# Patient Record
Sex: FEMALE | Race: WHITE | NOT HISPANIC OR LATINO | Employment: OTHER | URBAN - METROPOLITAN AREA
[De-identification: names, ages, dates, MRNs, and addresses within clinical notes are randomized per-mention and may not be internally consistent; named-entity substitution may affect disease eponyms.]

---

## 2022-05-11 NOTE — PROGRESS NOTES
Assessment/Plan:    Encounter for gynecological examination (general) (routine) without abnormal findings  All well, no complaints  Both children are engaged! Normal breast and pelvic exams  Last pap 5/2018, repeated today  Mammo order given, last 10/2020  Colonoscopy due next year  Dexa @67, no risk factors       Diagnoses and all orders for this visit:    Encounter for gynecological examination (general) (routine) without abnormal findings    Encounter for screening mammogram for malignant neoplasm of breast  -     Mammo screening bilateral w 3d & cad; Future    Screening for malignant neoplasm of the cervix  -     IGP, Aptima HPV, Rfx 16/18,45    Other orders  -     metroNIDAZOLE (METROCREAM) 0 75 % cream; APPLY DAILY TO SKIN TO AFFECTED AREA TWICE A DAY FOR 30 DAYS  -     fexofenadine (ALLEGRA) 180 MG tablet; Take 180 mg by mouth if needed          Subjective:      Patient ID: Michael Young is a 63 y o  female  Here for well check  The following portions of the patient's history were reviewed and updated as appropriate:   She  has a past medical history of Colon polyps and History of screening mammography (10/15/2020)  She   Patient Active Problem List    Diagnosis Date Noted    Encounter for gynecological examination (general) (routine) without abnormal findings 05/13/2022     She  has a past surgical history that includes Colonoscopy (2015) and Lipoma resection (2012)  Her family history includes Lung cancer (age of onset: 50) in her sister  She  reports that she has never smoked  She has never used smokeless tobacco  She reports current alcohol use  She reports that she does not use drugs    Current Outpatient Medications   Medication Sig Dispense Refill    fexofenadine (ALLEGRA) 180 MG tablet Take 180 mg by mouth if needed      metroNIDAZOLE (METROCREAM) 0 75 % cream APPLY DAILY TO SKIN TO AFFECTED AREA TWICE A DAY FOR 30 DAYS       No current facility-administered medications for this visit      She is allergic to sulfa antibiotics       Review of Systems  No PMB, breast, bladder, bowel changes   No new persistent pain, bloating, early satiety or pelvic pressure      Objective:      /68   Ht 5' 2 5" (1 588 m)   Wt 63 4 kg (139 lb 12 8 oz)   Breastfeeding No   BMI 25 16 kg/m²          Physical Exam  General appearance: no distress, pleasant  Neck: thyroid without nodules or thyromegaly, no palpable adenopathy  Lymph nodes: no palpable adenopathy  Breasts: no masses, nodes or skin changes, unchanged UOQ nodularity bilaterally  Abdomen: soft, non tender, no palpable masses  Pelvic exam: normal atrophic external genitalia, urethral meatus normal, vagina atrophic without lesions, cervix atrophic without lesions, uterus small, non tender, no adnexal masses, non tender  Rectal exam: normal sphincter tone, no masses, RV confirms above

## 2022-05-13 ENCOUNTER — ANNUAL EXAM (OUTPATIENT)
Dept: OBGYN CLINIC | Facility: CLINIC | Age: 62
End: 2022-05-13
Payer: COMMERCIAL

## 2022-05-13 VITALS
WEIGHT: 139.8 LBS | HEIGHT: 63 IN | BODY MASS INDEX: 24.77 KG/M2 | DIASTOLIC BLOOD PRESSURE: 68 MMHG | SYSTOLIC BLOOD PRESSURE: 110 MMHG

## 2022-05-13 DIAGNOSIS — Z01.419 ENCOUNTER FOR GYNECOLOGICAL EXAMINATION (GENERAL) (ROUTINE) WITHOUT ABNORMAL FINDINGS: Primary | ICD-10-CM

## 2022-05-13 DIAGNOSIS — Z12.31 ENCOUNTER FOR SCREENING MAMMOGRAM FOR MALIGNANT NEOPLASM OF BREAST: ICD-10-CM

## 2022-05-13 DIAGNOSIS — Z12.4 SCREENING FOR MALIGNANT NEOPLASM OF THE CERVIX: ICD-10-CM

## 2022-05-13 PROCEDURE — 99396 PREV VISIT EST AGE 40-64: CPT | Performed by: OBSTETRICS & GYNECOLOGY

## 2022-05-13 RX ORDER — FEXOFENADINE HCL 180 MG/1
180 TABLET ORAL AS NEEDED
COMMUNITY

## 2022-05-13 NOTE — LETTER
May 13, 2022     [address]  [facility] [address]    Patient: Benigno Reilly   YOB: 1960   Date of Visit: 5/13/2022       Dear Dr Jeanne Ruiz: Thank you for referring Francisco Luque to me for evaluation  Below are my notes for this consultation  If you have questions, please do not hesitate to call me  I look forward to following your patient along with you  Sincerely,        Barrie Caputo MD        CC: No Recipients  Barrie Caputo MD  5/13/2022 11:33 AM  Sign when Signing Visit  Assessment/Plan:    Encounter for gynecological examination (general) (routine) without abnormal findings  All well, no complaints  Both children are engaged! Normal breast and pelvic exams  Last pap 5/2018, repeated today  Mammo order given, last 10/2020  Colonoscopy due next year  Dexa @67, no risk factors       Diagnoses and all orders for this visit:    Encounter for gynecological examination (general) (routine) without abnormal findings    Encounter for screening mammogram for malignant neoplasm of breast  -     Mammo screening bilateral w 3d & cad; Future    Screening for malignant neoplasm of the cervix  -     IGP, Aptima HPV, Rfx 16/18,45    Other orders  -     metroNIDAZOLE (METROCREAM) 0 75 % cream; APPLY DAILY TO SKIN TO AFFECTED AREA TWICE A DAY FOR 30 DAYS  -     fexofenadine (ALLEGRA) 180 MG tablet; Take 180 mg by mouth if needed          Subjective:      Patient ID: Benigno Reilly is a 63 y o  female  Here for well check  The following portions of the patient's history were reviewed and updated as appropriate:   She  has a past medical history of Colon polyps and History of screening mammography (10/15/2020)    She   Patient Active Problem List    Diagnosis Date Noted    Encounter for gynecological examination (general) (routine) without abnormal findings 05/13/2022     She  has a past surgical history that includes Colonoscopy (2015) and Lipoma resection (2012)  Her family history includes Lung cancer (age of onset: 50) in her sister  She  reports that she has never smoked  She has never used smokeless tobacco  She reports current alcohol use  She reports that she does not use drugs  Current Outpatient Medications   Medication Sig Dispense Refill    fexofenadine (ALLEGRA) 180 MG tablet Take 180 mg by mouth if needed      metroNIDAZOLE (METROCREAM) 0 75 % cream APPLY DAILY TO SKIN TO AFFECTED AREA TWICE A DAY FOR 30 DAYS       No current facility-administered medications for this visit  She is allergic to sulfa antibiotics       Review of Systems  No PMB, breast, bladder, bowel changes   No new persistent pain, bloating, early satiety or pelvic pressure      Objective:      /68   Ht 5' 2 5" (1 588 m)   Wt 63 4 kg (139 lb 12 8 oz)   Breastfeeding No   BMI 25 16 kg/m²          Physical Exam  General appearance: no distress, pleasant  Neck: thyroid without nodules or thyromegaly, no palpable adenopathy  Lymph nodes: no palpable adenopathy  Breasts: no masses, nodes or skin changes, unchanged UOQ nodularity bilaterally  Abdomen: soft, non tender, no palpable masses  Pelvic exam: normal atrophic external genitalia, urethral meatus normal, vagina atrophic without lesions, cervix atrophic without lesions, uterus small, non tender, no adnexal masses, non tender  Rectal exam: normal sphincter tone, no masses, RV confirms above

## 2022-05-13 NOTE — ASSESSMENT & PLAN NOTE
All well, no complaints  Both children are engaged! Normal breast and pelvic exams    Last pap 5/2018, repeated today  Mammo order given, last 10/2020  Colonoscopy due next year  Dexa @67, no risk factors

## 2022-05-18 LAB
CYTOLOGIST CVX/VAG CYTO: NORMAL
DX ICD CODE: NORMAL
HPV I/H RISK 4 DNA CVX QL PROBE+SIG AMP: NEGATIVE
OTHER STN SPEC: NORMAL
PATH REPORT.FINAL DX SPEC: NORMAL
SL AMB NOTE:: NORMAL
SL AMB SPECIMEN ADEQUACY: NORMAL
SL AMB TEST METHODOLOGY: NORMAL

## 2022-08-23 DIAGNOSIS — Z12.31 ENCOUNTER FOR SCREENING MAMMOGRAM FOR MALIGNANT NEOPLASM OF BREAST: ICD-10-CM

## 2023-02-21 ENCOUNTER — OFFICE VISIT (OUTPATIENT)
Dept: OBGYN CLINIC | Facility: CLINIC | Age: 63
End: 2023-02-21

## 2023-02-21 VITALS
WEIGHT: 147.4 LBS | BODY MASS INDEX: 26.12 KG/M2 | DIASTOLIC BLOOD PRESSURE: 68 MMHG | SYSTOLIC BLOOD PRESSURE: 110 MMHG | HEIGHT: 63 IN

## 2023-02-21 DIAGNOSIS — N95.0 PMB (POSTMENOPAUSAL BLEEDING): Primary | ICD-10-CM

## 2023-02-21 RX ORDER — VITAMIN E (DL,TOCOPHERYL ACET) 180 MG
CAPSULE ORAL
COMMUNITY

## 2023-02-21 NOTE — LETTER
February 21, 2023     [place], [address]    Patient: Yojana Syed   YOB: 1960   Date of Visit: 2/21/2023       Dear Dr Yasmeen Evangelista Recipients: Thank you for referring Janet Hernandez to me for evaluation  Below are my notes for this consultation  If you have questions, please do not hesitate to call me  I look forward to following your patient along with you  Sincerely,        Adelita Yee MD        CC: No Recipients  Adelita Yee MD  2/21/2023  8:52 AM  Sign when Signing Visit  Assessment/Plan:    PMB (postmenopausal bleeding)  Noted light blood "flecks" in toilet and after void on paper 2/17-2/19 preceded by breast tenderness and cramping  No dysuria, frequency  On exam no bleeding noted, external os stenosis precluded biopsy  Will check u/a and C&S and pelvic ultrasound  Will need to reconsider endometrial sampling if EMS >4 mm  If blood noted in analysis will need urology referral      Will contact with further bleeding  Diagnoses and all orders for this visit:    PMB (postmenopausal bleeding)  -     US pelvis complete w transvaginal; Future  -     Urinalysis with microscopic; Future  -     Urine culture; Future  -     Urinalysis with microscopic  -     Urine culture    Other orders  -     Liquid-based pap, screening  -     Biotin w/ Vitamins C & E (Hair/Skin/Nails) 1250-7 5-7 5 MCG-MG-UNT CHEW; Chew Daily at 2am         Subjective:     Patient ID: Yojana Syed is a 63 y o  female  HPI Here with vaginal bleeding  LMP 8 years ago  The following portions of the patient's history were reviewed and updated as appropriate: She  has a past medical history of Colon polyps, History of screening mammography (10/15/2020), and History of screening mammography (08/15/2022)    She   Patient Active Problem List    Diagnosis Date Noted   • PMB (postmenopausal bleeding) 02/21/2023   • Encounter for gynecological examination (general) (routine) without abnormal findings 05/13/2022     She  has a past surgical history that includes Colonoscopy (2015) and Lipoma resection (2012)  Her family history includes Lung cancer (age of onset: 50) in her sister  She  reports that she has never smoked  She has never used smokeless tobacco  She reports current alcohol use  She reports that she does not currently use drugs  Current Outpatient Medications   Medication Sig Dispense Refill   • Biotin w/ Vitamins C & E (Hair/Skin/Nails) 1250-7 5-7 5 MCG-MG-UNT CHEW Chew Daily at 2am     • fexofenadine (ALLEGRA) 180 MG tablet Take 180 mg by mouth if needed     • metroNIDAZOLE (METROCREAM) 0 75 % cream if needed       No current facility-administered medications for this visit  She is allergic to sulfa antibiotics       Review of Systems +PMB, ?hematuria  No UTI symptoms  +cramping and breast tenderness resolved    Objective:      /68   Ht 5' 2 5" (1 588 m)   Wt 66 9 kg (147 lb 6 4 oz)   Breastfeeding No   BMI 26 53 kg/m²         Physical Exam   General appearance: no distress, pleasant  Pelvic exam: normal atrophic external genitalia, urethral meatus normal, vagina atrophic without lesions, cervix atrophic without bleeding; external os stenosis, uterus small, non tender, no adnexal masses, non tender  Rectal exam: deferred

## 2023-02-21 NOTE — PROGRESS NOTES
Assessment/Plan:    PMB (postmenopausal bleeding)  Noted light blood "flecks" in toilet and after void on paper 2/17-2/19 preceded by breast tenderness and cramping  No dysuria, frequency  On exam no bleeding noted, external os stenosis precluded biopsy  Will check u/a and C&S and pelvic ultrasound  Will need to reconsider endometrial sampling if EMS >4 mm  If blood noted in analysis will need urology referral      Will contact with further bleeding  Diagnoses and all orders for this visit:    PMB (postmenopausal bleeding)  -     US pelvis complete w transvaginal; Future  -     Urinalysis with microscopic; Future  -     Urine culture; Future  -     Urinalysis with microscopic  -     Urine culture    Other orders  -     Liquid-based pap, screening  -     Biotin w/ Vitamins C & E (Hair/Skin/Nails) 1250-7 5-7 5 MCG-MG-UNT CHEW; Chew Daily at 2am          Subjective:      Patient ID: Lynda Mcconnell is a 63 y o  female  HPI Here with vaginal bleeding  LMP 8 years ago  The following portions of the patient's history were reviewed and updated as appropriate: She  has a past medical history of Colon polyps, History of screening mammography (10/15/2020), and History of screening mammography (08/15/2022)  She   Patient Active Problem List    Diagnosis Date Noted   • PMB (postmenopausal bleeding) 02/21/2023   • Encounter for gynecological examination (general) (routine) without abnormal findings 05/13/2022     She  has a past surgical history that includes Colonoscopy (2015) and Lipoma resection (2012)  Her family history includes Lung cancer (age of onset: 50) in her sister  She  reports that she has never smoked  She has never used smokeless tobacco  She reports current alcohol use  She reports that she does not currently use drugs    Current Outpatient Medications   Medication Sig Dispense Refill   • Biotin w/ Vitamins C & E (Hair/Skin/Nails) 1250-7 5-7 5 MCG-MG-UNT CHEW Chew Daily at 2am     • fexofenadine (ALLEGRA) 180 MG tablet Take 180 mg by mouth if needed     • metroNIDAZOLE (METROCREAM) 0 75 % cream if needed       No current facility-administered medications for this visit  She is allergic to sulfa antibiotics       Review of Systems  +PMB, ?hematuria  No UTI symptoms  +cramping and breast tenderness resolved    Objective:      /68   Ht 5' 2 5" (1 588 m)   Wt 66 9 kg (147 lb 6 4 oz)   Breastfeeding No   BMI 26 53 kg/m²          Physical Exam    General appearance: no distress, pleasant  Pelvic exam: normal atrophic external genitalia, urethral meatus normal, vagina atrophic without lesions, cervix atrophic without bleeding; external os stenosis, uterus small, non tender, no adnexal masses, non tender  Rectal exam: deferred

## 2023-02-21 NOTE — ASSESSMENT & PLAN NOTE
Noted light blood "flecks" in toilet and after void on paper 2/17-2/19 preceded by breast tenderness and cramping  No dysuria, frequency  On exam no bleeding noted, external os stenosis precluded biopsy  Will check u/a and C&S and pelvic ultrasound  Will need to reconsider endometrial sampling if EMS >4 mm  If blood noted in analysis will need urology referral      Will contact with further bleeding

## 2023-02-21 NOTE — PATIENT INSTRUCTIONS
Please call the office if you do not hear about your results in 10-14 days  If you have any further bleeding please let me know

## 2023-02-22 ENCOUNTER — HOSPITAL ENCOUNTER (OUTPATIENT)
Dept: ULTRASOUND IMAGING | Facility: HOSPITAL | Age: 63
Discharge: HOME | End: 2023-02-22
Attending: OBSTETRICS & GYNECOLOGY

## 2023-02-22 DIAGNOSIS — N95.0 PMB (POSTMENOPAUSAL BLEEDING): ICD-10-CM

## 2023-02-23 LAB
APPEARANCE UR: CLEAR
BACTERIA UR CULT: NORMAL
BACTERIA URNS QL MICRO: NORMAL
BILIRUB UR QL STRIP: NEGATIVE
CASTS URNS QL MICRO: NORMAL /LPF
COLOR UR: YELLOW
EPI CELLS #/AREA URNS HPF: NORMAL /HPF (ref 0–10)
GLUCOSE UR QL: NEGATIVE
HGB UR QL STRIP: NEGATIVE
KETONES UR QL STRIP: NEGATIVE
LEUKOCYTE ESTERASE UR QL STRIP: NEGATIVE
Lab: NO GROWTH
MICRO URNS: NORMAL
MICRO URNS: NORMAL
NITRITE UR QL STRIP: NEGATIVE
PH UR STRIP: 7.5 [PH] (ref 5–7.5)
PROT UR QL STRIP: NEGATIVE
RBC #/AREA URNS HPF: NORMAL /HPF (ref 0–2)
SP GR UR: 1.01 (ref 1–1.03)
UROBILINOGEN UR STRIP-ACNC: 0.2 MG/DL (ref 0.2–1)
WBC #/AREA URNS HPF: NORMAL /HPF (ref 0–5)

## 2023-03-01 ENCOUNTER — TELEPHONE (OUTPATIENT)
Dept: OBGYN CLINIC | Facility: CLINIC | Age: 63
End: 2023-03-01

## 2023-03-01 NOTE — TELEPHONE ENCOUNTER
CHIKIS @[facility] notifying significant findings on 2/22 Ultrasound report   Report in chart

## 2023-03-02 ENCOUNTER — TELEPHONE (OUTPATIENT)
Dept: OBGYN CLINIC | Facility: CLINIC | Age: 63
End: 2023-03-02

## 2023-03-02 DIAGNOSIS — N88.2 CERVICAL STENOSIS (UTERINE CERVIX): Primary | ICD-10-CM

## 2023-03-02 RX ORDER — MISOPROSTOL 100 UG/1
TABLET ORAL
Qty: 1 TABLET | Refills: 0 | Status: SHIPPED | OUTPATIENT
Start: 2023-03-02 | End: 2023-03-08 | Stop reason: SDUPTHER

## 2023-03-02 NOTE — TELEPHONE ENCOUNTER
Spoke with patient and informed based upon US results, Dr Yasmine Pacheco would like to attempt another endo bx  A Rx for cytotec was sent to the pharmacy,instructed to to use 1/2 tablet vaginally the night prior to and the other half the morning of the biopsy  Recommended to take ibuprofen 600 mg 1hour prior to appointment  Pt transferred to reception to schedule Embx  Informed by  only availability in Dr Chloe Villarreal schedule is 4/3/23 or 4/4/23  Pt is requesting to be seen sooner  Please review schedule and advise

## 2023-03-02 NOTE — TELEPHONE ENCOUNTER
Please call with u/s results that warrant another attempt at endo bx  I sent cytotec rx in to pharmacy to use 1/2 tablet vaginally the night prior to and the other half the morning of the biopsy  Please schedule and remind to take ibuprofen  Thanks

## 2023-03-08 RX ORDER — MISOPROSTOL 100 UG/1
TABLET ORAL
Qty: 1 TABLET | Refills: 0 | Status: SHIPPED | OUTPATIENT
Start: 2023-03-08

## 2023-03-08 NOTE — TELEPHONE ENCOUNTER
Called patient and advised to  Rx at [facility] pharmacy as on file  Patient verbalized understanding

## 2023-03-08 NOTE — TELEPHONE ENCOUNTER
Pt presented to office today for EMBx however pt is scheduled for 3/9/23  Pt initiated pre-treatment of cytotec in preparation for appointment  Please send another Rx for Cytotec to [facility] [employer]  Pt request to have pharmacy "CUT" tablet

## 2023-03-08 NOTE — TELEPHONE ENCOUNTER
Rx sent along with instructions to pharmacist to cut tablet for patient      Nasim Perez MD  3/8/2023 12:36 PM

## 2023-03-09 ENCOUNTER — PROCEDURE VISIT (OUTPATIENT)
Dept: OBGYN CLINIC | Facility: CLINIC | Age: 63
End: 2023-03-09

## 2023-03-09 VITALS
SYSTOLIC BLOOD PRESSURE: 120 MMHG | WEIGHT: 144 LBS | HEIGHT: 63 IN | BODY MASS INDEX: 25.52 KG/M2 | DIASTOLIC BLOOD PRESSURE: 80 MMHG

## 2023-03-09 DIAGNOSIS — N95.0 PMB (POSTMENOPAUSAL BLEEDING): Primary | ICD-10-CM

## 2023-03-09 PROBLEM — N83.291 OTHER OVARIAN CYST, RIGHT SIDE: Status: ACTIVE | Noted: 2023-03-09

## 2023-03-09 NOTE — PROGRESS NOTES
Assessment/Plan:    Other ovarian cyst, right side  Small non-suspicious nodule noted on right with recommendation to f/u in one year  Will order follow up u/s at well check in the spring  Discussed, agrees to plan  PMB (postmenopausal bleeding)  No further bleeding  Urine studies were normal    U/S with 3 3 cm submucosal myoma and 8 mm EMS with cystic foci  Endo bx after cytotec today, will contact with results  Diagnoses and all orders for this visit:    PMB (postmenopausal bleeding)  -     Histology Specimen    Other orders  -     Endometrial biopsy          Subjective:      Patient ID: Howie Zepeda is a 63 y o  female  HPI Here for u/s review and endometrial biopsy  The following portions of the patient's history were reviewed and updated as appropriate: She  has a past medical history of Colon polyps, History of screening mammography (10/15/2020), and History of screening mammography (08/15/2022)  She   Patient Active Problem List    Diagnosis Date Noted   • Other ovarian cyst, right side 03/09/2023   • PMB (postmenopausal bleeding) 02/21/2023   • Encounter for gynecological examination (general) (routine) without abnormal findings 05/13/2022     She  has a past surgical history that includes Colonoscopy (2015) and Lipoma resection (2012)  Her family history includes Lung cancer (age of onset: 50) in her sister  She  reports that she has never smoked  She has never used smokeless tobacco  She reports current alcohol use  She reports that she does not currently use drugs    Current Outpatient Medications   Medication Sig Dispense Refill   • Biotin w/ Vitamins C & E (Hair/Skin/Nails) 1250-7 5-7 5 MCG-MG-UNT CHEW Chew Daily at 2am     • fexofenadine (ALLEGRA) 180 MG tablet Take 180 mg by mouth if needed     • metroNIDAZOLE (METROCREAM) 0 75 % cream if needed     • miSOPROStol (Cytotec) 100 mcg tablet 1/2 tablet in the vagina the night prior to the procedure; repeat with the other half the morning of the procedure  1 tablet 0     No current facility-administered medications for this visit  She is allergic to sulfa antibiotics       Review of Systems      Objective:      /80 (BP Location: Right arm, Patient Position: Sitting, Cuff Size: Standard)   Ht 5' 2 5" (1 588 m)   Wt 65 3 kg (144 lb)   BMI 25 92 kg/m²          Physical Exam    Appears well, no apparent distress  Does not appear anxious or depressed  Pelvic exam: normal external genitalia, vagina normal no abnormal discharge, cervix  with external os stenosis, overcome with touch of #11 blade    Endometrial biopsy    Date/Time: 3/9/2023 12:14 PM  Performed by: Yenifer Silva MD  Authorized by: Yenifer Silva MD   Universal Protocol:  Consent: Verbal consent obtained  Written consent obtained  Risks and benefits: risks, benefits and alternatives were discussed  Consent given by: patient  Patient understanding: patient states understanding of the procedure being performed  Patient consent: the patient's understanding of the procedure matches consent given  Relevant documents: relevant documents present and verified  Patient identity confirmed: verbally with patient      Indication:     Indications: Post-menopausal bleeding      Progression of post-menopausal bleeding:  Resolved  Pre-procedure:     Anesthesia premedication: cytotec    Procedure:     Procedure: endometrial biopsy with Pipelle      A bivalve speculum was placed in the vagina: yes      Cervix cleaned and prepped: yes      A paracervical block was performed: no      The cervix was dilated: yes (after eva with #11 blade at scarred external os)      Uterus sounded: yes      Uterus sound depth (cm):  8    Specimen collected: specimen collected and sent to pathology      Patient tolerated procedure well with no complications: yes    Findings:     Uterus size:  Non-gravid    Cervix: normal    Comments:     Procedure comments:  Endo bx after scarred external os overcome with sharp incision            2/22/23 u/s: Images reviewed  AV uterus 8 5 cm with 3 3 cm submucosal myoma  EMS 8 mm with cystic foci   R ov 2 7 with 1 6 cm avascular hypoechoic nodule and L ov 1 7 cm, WNL

## 2023-03-09 NOTE — ASSESSMENT & PLAN NOTE
Small non-suspicious nodule noted on right with recommendation to f/u in one year  Will order follow up u/s at well check in the spring  Discussed, agrees to plan

## 2023-03-09 NOTE — ASSESSMENT & PLAN NOTE
No further bleeding  Urine studies were normal    U/S with 3 3 cm submucosal myoma and 8 mm EMS with cystic foci  Endo bx after cytotec today, will contact with results

## 2023-03-14 LAB
PATH REPORT.SITE OF ORIGIN SPEC: NORMAL
PAYMENT PROCEDURE: NORMAL
SL AMB .: NORMAL

## 2023-03-28 ENCOUNTER — TELEPHONE (OUTPATIENT)
Dept: GASTROENTEROLOGY | Facility: AMBULARY SURGERY CENTER | Age: 63
End: 2023-03-28

## 2023-03-28 NOTE — TELEPHONE ENCOUNTER
Patients GI provider:  Dr Cline Fraction    Number to return call: [phone]    Reason for call: Pt calling stating she is due to have a repeat colonoscopy  Patient stated she spoke with a nurse and says that she is due in ten years but from last procedure she was advised that she is due in 7 years  Requesting to speak with someone to clarify       Scheduled procedure/appointment date if applicable: Apt/procedure n/a

## 2023-03-28 NOTE — TELEPHONE ENCOUNTER
Looked up pt last colon report  Per Dr Macy Sterling note, pt is a 10 year recall  She is due 9/2023  Spoke with pt  And relayed the info

## 2023-10-06 ENCOUNTER — ANNUAL EXAM (OUTPATIENT)
Dept: OBGYN CLINIC | Facility: CLINIC | Age: 63
End: 2023-10-06
Payer: COMMERCIAL

## 2023-10-06 VITALS
BODY MASS INDEX: 26.79 KG/M2 | DIASTOLIC BLOOD PRESSURE: 62 MMHG | HEIGHT: 63 IN | WEIGHT: 151.2 LBS | SYSTOLIC BLOOD PRESSURE: 110 MMHG

## 2023-10-06 DIAGNOSIS — Z01.419 ENCOUNTER FOR GYNECOLOGICAL EXAMINATION (GENERAL) (ROUTINE) WITHOUT ABNORMAL FINDINGS: Primary | ICD-10-CM

## 2023-10-06 DIAGNOSIS — N83.291 OTHER OVARIAN CYST, RIGHT SIDE: ICD-10-CM

## 2023-10-06 DIAGNOSIS — Z12.31 BREAST CANCER SCREENING BY MAMMOGRAM: ICD-10-CM

## 2023-10-06 PROBLEM — N95.0 PMB (POSTMENOPAUSAL BLEEDING): Status: RESOLVED | Noted: 2023-02-21 | Resolved: 2023-10-06

## 2023-10-06 PROCEDURE — 99396 PREV VISIT EST AGE 40-64: CPT | Performed by: OBSTETRICS & GYNECOLOGY

## 2023-10-06 NOTE — ASSESSMENT & PLAN NOTE
All well, no complaints. Normal breast and pelvic exams.   Last pap 5/2022 neg/HPV neg; due 2027  Mammo order given, last 8/15/22  Colonoscopy 2015, calling to schedule  Dexa @67, no risk factors

## 2023-10-06 NOTE — LETTER
October 6, 2023     Kyle Husbands, [address]    Patient: Dameon Renteria   YOB: 1960   Date of Visit: 10/6/2023       Dear Dr. Toure Base:    Ophelia Abraham was in today for her annual gyn exam. Below are my notes for this consultation. If you have questions, please do not hesitate to call me. I look forward to following your patient along with you. Sincerely,        Jennie Osei MD        CC: No Recipients    Jennie Osei MD  10/6/2023  1:02 PM  Incomplete  Assessment/Plan:    Encounter for gynecological examination (general) (routine) without abnormal findings  All well, no complaints. Normal breast and pelvic exams. Last pap 5/2022 neg/HPV neg; due 2027  Mammo order given, last 8/15/22  Colonoscopy 2015, calling to schedule  Dexa @67, no risk factors        Other ovarian cyst, right side  Will follow up non-suspicious nodule noted on right in March. Orders given. Diagnoses and all orders for this visit:    Breast cancer screening by mammogram  -     Mammo screening bilateral w 3d & cad; Future    Other ovarian cyst, right side  -     US pelvis complete w transvaginal; Future    Encounter for gynecological examination (general) (routine) without abnormal findings         Subjective:     Patient ID: Dameon Renteria is a 63 y.o. female. HPI Here for well check. The following portions of the patient's history were reviewed and updated as appropriate:   She  has a past medical history of Colon polyps, History of screening mammography (10/15/2020), and History of screening mammography (08/15/2022). She   Patient Active Problem List    Diagnosis Date Noted   • Other ovarian cyst, right side 03/09/2023   • Encounter for gynecological examination (general) (routine) without abnormal findings 05/13/2022     She  has a past surgical history that includes Colonoscopy (2015) and Lipoma resection (2012).   Her family history includes Lung cancer (age of onset: 50) in her sister; No Known Problems in her daughter, father, maternal grandfather, maternal grandmother, mother, paternal grandfather, paternal grandmother, and son; Reye's syndrome in her brother. She  reports that she has never smoked. She has never used smokeless tobacco. She reports current alcohol use. She reports that she does not use drugs. Current Outpatient Medications   Medication Sig Dispense Refill   • Biotin w/ Vitamins C & E (Hair/Skin/Nails) 1250-7.5-7.5 MCG-MG-UNT CHEW Chew Daily at 2am     • fexofenadine (ALLEGRA) 180 MG tablet Take 180 mg by mouth if needed       No current facility-administered medications for this visit. She is allergic to sulfa antibiotics. .    Review of Systems No PMB, breast, bladder, bowel changes. No new persistent pain, bloating, early satiety or pelvic pressure      Objective:      /62 (BP Location: Left arm, Patient Position: Sitting, Cuff Size: Standard)   Ht 5' 2.5" (1.588 m)   Wt 68.6 kg (151 lb 3.2 oz)   BMI 27.21 kg/m²         Physical Exam   General appearance: no distress, pleasant  Neck: thyroid without nodules or thyromegaly, no palpable adenopathy  Lymph nodes: no palpable adenopathy  Breasts: no masses, nodes or skin changes  Abdomen: soft, non tender, no palpable masses  Pelvic exam: normal atrophic external genitalia, urethral meatus normal, vagina atrophic without lesions, cervix atrophic without lesions, uterus small, non tender, no adnexal masses, non tender  Rectal exam: normal sphincter tone, no masses, RV confirms above      Grant Zelaya MD  10/6/2023 12:48 PM  Sign when Signing Visit  Assessment/Plan:    No problem-specific Assessment & Plan notes found for this encounter. Diagnoses and all orders for this visit:    Breast cancer screening by mammogram  -     Mammo screening bilateral w 3d & cad; Future         Subjective:     Patient ID: Cortez Edward is a 63 y.o. female. HPI Here for well check.       The following portions of the patient's history were reviewed and updated as appropriate:   She  has a past medical history of Colon polyps, History of screening mammography (10/15/2020), and History of screening mammography (08/15/2022). She   Patient Active Problem List    Diagnosis Date Noted   • Other ovarian cyst, right side 03/09/2023   • PMB (postmenopausal bleeding) 02/21/2023   • Encounter for gynecological examination (general) (routine) without abnormal findings 05/13/2022     She  has a past surgical history that includes Colonoscopy (2015) and Lipoma resection (2012). Her family history includes Lung cancer (age of onset: 50) in her sister; No Known Problems in her daughter, father, maternal grandfather, maternal grandmother, mother, paternal grandfather, paternal grandmother, and son; Reye's syndrome in her brother. She  reports that she has never smoked. She has never used smokeless tobacco. She reports current alcohol use. She reports that she does not use drugs. Current Outpatient Medications   Medication Sig Dispense Refill   • Biotin w/ Vitamins C & E (Hair/Skin/Nails) 1250-7.5-7.5 MCG-MG-UNT CHEW Chew Daily at 2am     • fexofenadine (ALLEGRA) 180 MG tablet Take 180 mg by mouth if needed     • metroNIDAZOLE (METROCREAM) 0.75 % cream if needed     • miSOPROStol (Cytotec) 100 mcg tablet 1/2 tablet in the vagina the night prior to the procedure; repeat with the other half the morning of the procedure. 1 tablet 0     No current facility-administered medications for this visit. She is allergic to sulfa antibiotics. .    Review of Systems No PMB, breast, bladder, bowel changes.  No new persistent pain, bloating, early satiety or pelvic pressure      Objective:      /62 (BP Location: Left arm, Patient Position: Sitting, Cuff Size: Standard)   Ht 5' 2.5" (1.588 m)   Wt 68.6 kg (151 lb 3.2 oz)   BMI 27.21 kg/m²         Physical Exam   General appearance: no distress, pleasant  Neck: thyroid without nodules or thyromegaly, no palpable adenopathy  Lymph nodes: no palpable adenopathy  Breasts: no masses, nodes or skin changes  Abdomen: soft, non tender, no palpable masses  Pelvic exam: normal atrophic external genitalia, urethral meatus normal, vagina atrophic without lesions, cervix atrophic without lesions, uterus small, non tender, no adnexal masses, non tender  Rectal exam: normal sphincter tone, no masses, RV confirms above

## 2023-10-06 NOTE — PATIENT INSTRUCTIONS
Return to office in one year unless having any problems such as breast changes, bleeding, new persistent pain, new progressive bloating, new problems eating (getting full to quickly) or new constant urinary pressure that does not resolve in one week. Call in six months to schedule your annual visit. Continue to strive for 1200 mg of calcium and 1000 IU of vitamin D daily in diet and supplements combined for your bone health. You can only absorb 600 mg of calcium at a time. Avoid excess calcium as this may adversely effect your heart. There are 400 mg of calcium in an 8 oz serving of dairy. Schedule the ultrasound for early March to follow up the small ovarian cyst seen in 2023.

## 2023-10-06 NOTE — PROGRESS NOTES
Assessment/Plan:    Encounter for gynecological examination (general) (routine) without abnormal findings  All well, no complaints. Normal breast and pelvic exams. Last pap 5/2022 neg/HPV neg; due 2027  Mammo order given, last 8/15/22  Colonoscopy 2015, calling to schedule  Dexa @67, no risk factors        Other ovarian cyst, right side  Will follow up non-suspicious nodule noted on right in March. Orders given. Diagnoses and all orders for this visit:    Encounter for gynecological examination (general) (routine) without abnormal findings    Breast cancer screening by mammogram  -     Mammo screening bilateral w 3d & cad; Future    Other ovarian cyst, right side  -     US pelvis complete w transvaginal; Future          Subjective:      Patient ID: Fahad Higgins is a 63 y.o. female. HPI Here for well check. The following portions of the patient's history were reviewed and updated as appropriate:   She  has a past medical history of Colon polyps, History of screening mammography (10/15/2020), and History of screening mammography (08/15/2022). She   Patient Active Problem List    Diagnosis Date Noted   • Other ovarian cyst, right side 03/09/2023   • Encounter for gynecological examination (general) (routine) without abnormal findings 05/13/2022     She  has a past surgical history that includes Colonoscopy (2015) and Lipoma resection (2012). Her family history includes Lung cancer (age of onset: 50) in her sister; No Known Problems in her daughter, father, maternal grandfather, maternal grandmother, mother, paternal grandfather, paternal grandmother, and son; Reye's syndrome in her brother. She  reports that she has never smoked. She has never used smokeless tobacco. She reports current alcohol use. She reports that she does not use drugs.   Current Outpatient Medications   Medication Sig Dispense Refill   • Biotin w/ Vitamins C & E (Hair/Skin/Nails) 1250-7.5-7.5 MCG-MG-UNT CHEW Chew Daily at 2am     • fexofenadine (ALLEGRA) 180 MG tablet Take 180 mg by mouth if needed       No current facility-administered medications for this visit. She is allergic to sulfa antibiotics. .    Review of Systems  No PMB, breast, bladder, bowel changes.  No new persistent pain, bloating, early satiety or pelvic pressure      Objective:      /62 (BP Location: Left arm, Patient Position: Sitting, Cuff Size: Standard)   Ht 5' 2.5" (1.588 m)   Wt 68.6 kg (151 lb 3.2 oz)   BMI 27.21 kg/m²          Physical Exam    General appearance: no distress, pleasant  Neck: thyroid without nodules or thyromegaly, no palpable adenopathy  Lymph nodes: no palpable adenopathy  Breasts: no masses, nodes or skin changes  Abdomen: soft, non tender, no palpable masses  Pelvic exam: normal atrophic external genitalia, urethral meatus normal, vagina atrophic without lesions, cervix atrophic without lesions, uterus small, non tender, no adnexal masses, non tender  Rectal exam: normal sphincter tone, no masses, RV confirms above

## 2024-02-14 ENCOUNTER — HOSPITAL ENCOUNTER (OUTPATIENT)
Dept: MAMMOGRAPHY | Facility: CLINIC | Age: 64
Discharge: HOME | End: 2024-02-14
Payer: COMMERCIAL

## 2024-02-14 VITALS — BODY MASS INDEX: 26.75 KG/M2 | WEIGHT: 151 LBS | HEIGHT: 63 IN

## 2024-02-14 DIAGNOSIS — Z12.31 BREAST CANCER SCREENING BY MAMMOGRAM: ICD-10-CM

## 2024-02-14 PROCEDURE — 77067 SCR MAMMO BI INCL CAD: CPT

## 2024-02-14 PROCEDURE — 77063 BREAST TOMOSYNTHESIS BI: CPT

## 2024-02-21 PROBLEM — Z01.419 ENCOUNTER FOR GYNECOLOGICAL EXAMINATION (GENERAL) (ROUTINE) WITHOUT ABNORMAL FINDINGS: Status: RESOLVED | Noted: 2022-05-13 | Resolved: 2024-02-21

## 2024-03-13 ENCOUNTER — HOSPITAL ENCOUNTER (OUTPATIENT)
Dept: ULTRASOUND IMAGING | Facility: HOSPITAL | Age: 64
Discharge: HOME | End: 2024-03-13
Attending: OBSTETRICS & GYNECOLOGY
Payer: COMMERCIAL

## 2024-03-13 DIAGNOSIS — N83.291 OTHER OVARIAN CYST, RIGHT SIDE: ICD-10-CM

## 2024-03-13 PROCEDURE — 76830 TRANSVAGINAL US NON-OB: CPT

## 2024-03-13 PROCEDURE — 76856 US EXAM PELVIC COMPLETE: CPT

## 2024-06-17 ENCOUNTER — TELEPHONE (OUTPATIENT)
Age: 64
End: 2024-06-17

## 2024-06-17 NOTE — TELEPHONE ENCOUNTER
Does the patient want to see a Gastroenterologist prior to their procedure OR are they having any GI symptoms? no    Has the patient been hospitalized or had abdominal surgery in the past 6 months? no    Does the patient use supplemental oxygen? no    Does the patient take Coumadin, Lovenox, Plavix, Elliquis, Xarelto, or other blood thinning medication? no    Has the patient had a stroke, cardiac event, or stent placed in the past year? no    BMI 27.18

## 2024-06-17 NOTE — TELEPHONE ENCOUNTER
Scheduled date of colonoscopy (as of today):7/18/24  Physician performing colonoscopy:DR LOWRY  Location of colonoscopy:[facility]  Bowel prep reviewed with patient:YARITZA/TOO SENT TO [facility]  Instructions reviewed with patient by:MEL  Clearances: NA

## 2024-06-17 NOTE — TELEPHONE ENCOUNTER
We received a message via [employer] that she wants to schedule a colonoscopy. Left VM to call the office back

## 2024-06-20 ENCOUNTER — ANESTHESIA (OUTPATIENT)
Dept: ANESTHESIOLOGY | Facility: AMBULATORY SURGERY CENTER | Age: 64
End: 2024-06-20

## 2024-06-20 ENCOUNTER — ANESTHESIA EVENT (OUTPATIENT)
Dept: ANESTHESIOLOGY | Facility: AMBULATORY SURGERY CENTER | Age: 64
End: 2024-06-20

## 2024-07-18 ENCOUNTER — ANESTHESIA EVENT (OUTPATIENT)
Dept: GASTROENTEROLOGY | Facility: AMBULATORY SURGERY CENTER | Age: 64
End: 2024-07-18

## 2024-07-18 ENCOUNTER — HOSPITAL ENCOUNTER (OUTPATIENT)
Dept: GASTROENTEROLOGY | Facility: AMBULATORY SURGERY CENTER | Age: 64
Discharge: HOME | End: 2024-07-18
Payer: COMMERCIAL

## 2024-07-18 ENCOUNTER — ANESTHESIA (OUTPATIENT)
Dept: GASTROENTEROLOGY | Facility: AMBULATORY SURGERY CENTER | Age: 64
End: 2024-07-18

## 2024-07-18 VITALS
BODY MASS INDEX: 26.58 KG/M2 | RESPIRATION RATE: 20 BRPM | HEIGHT: 63 IN | TEMPERATURE: 97.7 F | OXYGEN SATURATION: 99 % | WEIGHT: 150 LBS | DIASTOLIC BLOOD PRESSURE: 73 MMHG | SYSTOLIC BLOOD PRESSURE: 111 MMHG | HEART RATE: 67 BPM

## 2024-07-18 DIAGNOSIS — Z12.11 SCREENING FOR COLON CANCER: ICD-10-CM

## 2024-07-18 PROCEDURE — 45380 COLONOSCOPY AND BIOPSY: CPT | Performed by: INTERNAL MEDICINE

## 2024-07-18 PROCEDURE — 88305 TISSUE EXAM BY PATHOLOGIST: CPT | Performed by: PATHOLOGY

## 2024-07-18 RX ORDER — SODIUM CHLORIDE, SODIUM LACTATE, POTASSIUM CHLORIDE, CALCIUM CHLORIDE 600; 310; 30; 20 MG/100ML; MG/100ML; MG/100ML; MG/100ML
50 INJECTION, SOLUTION INTRAVENOUS CONTINUOUS
Status: DISCONTINUED | OUTPATIENT
Start: 2024-07-18 | End: 2024-07-22 | Stop reason: HOSPADM

## 2024-07-18 RX ORDER — SODIUM CHLORIDE, SODIUM LACTATE, POTASSIUM CHLORIDE, CALCIUM CHLORIDE 600; 310; 30; 20 MG/100ML; MG/100ML; MG/100ML; MG/100ML
INJECTION, SOLUTION INTRAVENOUS CONTINUOUS PRN
Status: DISCONTINUED | OUTPATIENT
Start: 2024-07-18 | End: 2024-07-18

## 2024-07-18 RX ORDER — LIDOCAINE HYDROCHLORIDE 10 MG/ML
INJECTION, SOLUTION EPIDURAL; INFILTRATION; INTRACAUDAL; PERINEURAL AS NEEDED
Status: DISCONTINUED | OUTPATIENT
Start: 2024-07-18 | End: 2024-07-18

## 2024-07-18 RX ORDER — PROPOFOL 10 MG/ML
INJECTION, EMULSION INTRAVENOUS AS NEEDED
Status: DISCONTINUED | OUTPATIENT
Start: 2024-07-18 | End: 2024-07-18

## 2024-07-18 RX ADMIN — PROPOFOL 100 MG: 10 INJECTION, EMULSION INTRAVENOUS at 10:16

## 2024-07-18 RX ADMIN — SODIUM CHLORIDE, SODIUM LACTATE, POTASSIUM CHLORIDE, CALCIUM CHLORIDE 50 ML/HR: 600; 310; 30; 20 INJECTION, SOLUTION INTRAVENOUS at 09:42

## 2024-07-18 RX ADMIN — SODIUM CHLORIDE, SODIUM LACTATE, POTASSIUM CHLORIDE, CALCIUM CHLORIDE: 600; 310; 30; 20 INJECTION, SOLUTION INTRAVENOUS at 10:09

## 2024-07-18 RX ADMIN — PROPOFOL 50 MG: 10 INJECTION, EMULSION INTRAVENOUS at 10:17

## 2024-07-18 RX ADMIN — LIDOCAINE HYDROCHLORIDE 50 MG: 10 INJECTION, SOLUTION EPIDURAL; INFILTRATION; INTRACAUDAL; PERINEURAL at 10:15

## 2024-07-18 RX ADMIN — PROPOFOL 50 MG: 10 INJECTION, EMULSION INTRAVENOUS at 10:21

## 2024-07-18 RX ADMIN — PROPOFOL 30 MG: 10 INJECTION, EMULSION INTRAVENOUS at 10:25

## 2024-07-18 NOTE — H&P
"History and Physical -  [facility]  Beckie Norris 63 y.o. female MRN: [id number]    HPI: Beckie Norris is a 63 y.o. year old female who presents for colon cancer screening    REVIEW OF SYSTEMS: Per the HPI, and otherwise unremarkable.    Historical Information   Past Medical History:   Diagnosis Date    Colon polyps     History of screening mammography 10/15/2020    Bi-Rads 2C    History of screening mammography 08/15/2022    Bi-Rads 2.     Past Surgical History:   Procedure Laterality Date    COLONOSCOPY  2015    Due 2023, polyp    LIPOMA RESECTION  2012     Social History   Social History     Substance and Sexual Activity   Alcohol Use Yes    Comment: social     Social History     Substance and Sexual Activity   Drug Use Never     Social History     Tobacco Use   Smoking Status Never   Smokeless Tobacco Never     Family History   Problem Relation Age of Onset    No Known Problems Mother     No Known Problems Father     Lung cancer Sister 48    No Known Problems Daughter     No Known Problems Maternal Grandmother     No Known Problems Maternal Grandfather     No Known Problems Paternal Grandmother     No Known Problems Paternal Grandfather     Reye's syndrome Brother     No Known Problems Son     No Known Problems Paternal Aunt     No Known Problems Paternal Aunt     Breast cancer Neg Hx     Uterine cancer Neg Hx     Ovarian cancer Neg Hx     Colon cancer Neg Hx        Meds/Allergies       Current Outpatient Medications:     Biotin w/ Vitamins C & E (Hair/Skin/Nails) 1250-7.5-7.5 MCG-MG-UNT CHEW    fexofenadine (ALLEGRA) 180 MG tablet    Current Facility-Administered Medications:     lactated ringers infusion, 50 mL/hr, Intravenous, Continuous, 50 mL/hr at 07/18/24 0942    Allergies   Allergen Reactions    Sulfa Antibiotics Other (See Comments)     unknown       Objective     /66   Pulse 66   Temp 97.7 °F (36.5 °C) (Temporal)   Resp 16   Ht 5' 2.5\" (1.588 m)   Wt 68 kg " (150 lb)   SpO2 95%   BMI 27.00 kg/m²     PHYSICAL EXAM    Gen: NAD AAOx3  Head: Normocephalic, Atraumatic  CV: S1S2 RRR no m/r/g  CHEST: Clear b/l no c/r/w  ABD: soft, +BS NT/ND  EXT: no edema    ASSESSMENT/PLAN:  This is a 63 y.o. year old female here for colonoscopy, and she is stable and optimized for her procedure.

## 2024-07-18 NOTE — ANESTHESIA POSTPROCEDURE EVALUATION
Post-Op Assessment Note    CV Status:  Stable  Pain Score: 0    Pain management: adequate       Mental Status:  Sleepy   Hydration Status:  Euvolemic   PONV Controlled:  Controlled   Airway Patency:  Patent     Post Op Vitals Reviewed: Yes    No anethesia notable event occurred.    Staff: MICHAEL           BP   107/67   Temp      Pulse 76   Resp 14   SpO2 97%ra

## 2024-07-18 NOTE — ANESTHESIA PREPROCEDURE EVALUATION
Procedure:  COLONOSCOPY    Relevant Problems   ANESTHESIA (within normal limits)        Physical Exam    Airway    Mallampati score: I  TM Distance: >3 FB  Neck ROM: full     Dental   No notable dental hx     Cardiovascular  Cardiovascular exam normal    Pulmonary  Pulmonary exam normal     Other Findings  post-pubertal.      Anesthesia Plan  ASA Score- 1     Anesthesia Type- IV sedation with anesthesia with ASA Monitors.         Additional Monitors:     Airway Plan:     Comment: I discussed risks (reviewed with patient on the anesthesia consent form), benefits and alternatives of monitored sedation including the possibility under sedation to have recall or mild discomfort.  .       Plan Factors-    Chart reviewed.    Patient summary reviewed.                  Induction- intravenous.    Postoperative Plan-         Informed Consent- Anesthetic plan and risks discussed with patient.  I personally reviewed this patient with the CRNA. Discussed and agreed on the Anesthesia Plan with the CRNA..

## 2025-03-07 NOTE — PROGRESS NOTES
Name: Beckie Norris      : 1960      MRN: [id number]  Encounter Provider: Haylie Larios MD  Encounter Date: 3/11/2025   Encounter department: [facility] [place]  :  Assessment & Plan  Encounter for gynecological examination (general) (routine) without abnormal findings  Here for well check, no breast or gyn concerns. Requesting routine lab orders to f/u with PCP.    Normal breast and pelvic exams.  Last pap 2022 neg/HPV neg; due by   Mammo order given, last 24 BIRADS 1B  Colonoscopy 2024, benign polyp, due   Dexa @67, no risk factors. Calcium recs reviewed       Other ovarian cyst, right side  No symptoms, normal exam, reviewed imaging from last year. No f/u indicated.    3/13/24 AV uterus 5.9 x 3.6 x 4.3 cm with submucosal 2.8 cm leiomyoma. EMS 2 mm. R ov 2.4 x 1.9 cm with 1.6 cm simple cyst. Left ovary not seen.  No free fluid or collections     23 AV uterus 8.5 cm with 3.3 cm submucosal myoma. EMS 8 mm with cystic foci. R ov 2.7 with 1.6 cm avascular hypoechoic nodule and L ov 1.7 cm, WNL       Vaginal atrophy  Option for estradiol cream given with R&B explained. No contraindication.   Rx sent  Orders:    estradiol (ESTRACE VAGINAL) 0.1 mg/g vaginal cream; Insert 1 g into the vagina 2 (two) times a week    Screening for metabolic disorder    Orders:    Comprehensive metabolic panel; Future    Screening for lipid disorders    Orders:    Lipid panel; Future    Screening for deficiency anemia    Orders:    CBC and differential; Future    Screening for thyroid disorder    Orders:    TSH, 3rd generation with Free T4 reflex; Future    Breast cancer screening by mammogram    Orders:    Mammo screening bilateral w 3d and cad; Future        History of Present Illness   HPI  Beckie Norris is a 64 y.o. female who presents for well check.        Review of Systems  No PMB, breast, bladder, bowel changes. No new persistent pain, bloating, early satiety or pelvic  "pressure  +vaginal dryness    Past Medical History   Past Medical History:   Diagnosis Date    Colon polyps      Past Surgical History:   Procedure Laterality Date    COLONOSCOPY  07/2024    Due 2034    LIPOMA RESECTION  2012     Family History   Problem Relation Age of Onset    No Known Problems Mother     No Known Problems Father     Lung cancer Sister 48    No Known Problems Daughter     No Known Problems Maternal Grandmother     No Known Problems Maternal Grandfather     No Known Problems Paternal Grandmother     No Known Problems Paternal Grandfather     Reye's syndrome Brother     No Known Problems Son     No Known Problems Paternal Aunt     No Known Problems Paternal Aunt     Breast cancer Neg Hx     Uterine cancer Neg Hx     Ovarian cancer Neg Hx     Colon cancer Neg Hx       reports that she has never smoked. She has never used smokeless tobacco. She reports current alcohol use. She reports that she does not currently use drugs.  Current Outpatient Medications   Medication Instructions    [START ON 3/13/2025] estradiol (ESTRACE VAGINAL) 1 g, Vaginal, 2 times weekly    fexofenadine (ALLEGRA) 180 mg, As needed    Multiple Vitamins-Minerals (MULTIVITAMIN ADULTS 50+ PO) Take by mouth     Allergies   Allergen Reactions    Sulfa Antibiotics Other (See Comments)     unknown         Objective   /66 (BP Location: Left arm, Patient Position: Sitting, Cuff Size: Large)   Ht 5' 2.75\" (1.594 m)   Wt 71.2 kg (157 lb)   BMI 28.03 kg/m²      Physical Exam  General appearance: no distress, pleasant  Neck: thyroid without nodules or thyromegaly, no palpable adenopathy  Lymph nodes: no palpable adenopathy  Breasts: no masses, nodes or skin changes  Abdomen: soft, non tender, no palpable masses  Pelvic exam: normal atrophic external genitalia, urethral meatus normal, vagina atrophic without lesions, cervix atrophic without lesions, uterus small, non tender, no adnexal masses, non tender  Rectal exam: normal sphincter " tone, no masses, RV confirms above

## 2025-03-11 ENCOUNTER — OFFICE VISIT (OUTPATIENT)
Dept: OBGYN CLINIC | Facility: CLINIC | Age: 65
End: 2025-03-11
Payer: COMMERCIAL

## 2025-03-11 VITALS
BODY MASS INDEX: 27.82 KG/M2 | SYSTOLIC BLOOD PRESSURE: 112 MMHG | WEIGHT: 157 LBS | HEIGHT: 63 IN | DIASTOLIC BLOOD PRESSURE: 66 MMHG

## 2025-03-11 DIAGNOSIS — Z13.0 SCREENING FOR DEFICIENCY ANEMIA: ICD-10-CM

## 2025-03-11 DIAGNOSIS — N83.291 OTHER OVARIAN CYST, RIGHT SIDE: ICD-10-CM

## 2025-03-11 DIAGNOSIS — Z12.31 BREAST CANCER SCREENING BY MAMMOGRAM: ICD-10-CM

## 2025-03-11 DIAGNOSIS — Z13.29 SCREENING FOR THYROID DISORDER: ICD-10-CM

## 2025-03-11 DIAGNOSIS — N95.2 VAGINAL ATROPHY: ICD-10-CM

## 2025-03-11 DIAGNOSIS — Z13.220 SCREENING FOR LIPID DISORDERS: ICD-10-CM

## 2025-03-11 DIAGNOSIS — Z13.228 SCREENING FOR METABOLIC DISORDER: ICD-10-CM

## 2025-03-11 DIAGNOSIS — Z01.419 ENCOUNTER FOR GYNECOLOGICAL EXAMINATION (GENERAL) (ROUTINE) WITHOUT ABNORMAL FINDINGS: Primary | ICD-10-CM

## 2025-03-11 PROCEDURE — 99396 PREV VISIT EST AGE 40-64: CPT | Performed by: OBSTETRICS & GYNECOLOGY

## 2025-03-11 RX ORDER — ESTRADIOL 0.1 MG/G
1 CREAM VAGINAL 2 TIMES WEEKLY
Qty: 42.5 G | Refills: 1 | Status: SHIPPED | OUTPATIENT
Start: 2025-03-13

## 2025-03-11 NOTE — PATIENT INSTRUCTIONS
Return to office in one year unless having any problems such as breast changes, bleeding, new persistent pain, new progressive bloating, new problems eating (getting full to quickly) or new constant urinary pressure that does not resolve in one week.    Continue to strive for 1200 mg of calcium and 1000 IU of vitamin D daily in diet and supplements combined for your bone health.  You can only absorb 600 mg of calcium at a time. Avoid excess calcium as this may adversely effect your heart.  There are 400 mg of calcium in an 8 oz serving of dairy.  [place] milk has 600 mg of calcium in an 8 oz serving.    Dr. Lulu Roper, dermatology. [phone].  [email or web address].  Located at [address] [place] [address], [address], [place]

## 2025-03-11 NOTE — LETTER
2025     Irving Pierce MD  [address] [facility] [address]    Patient: Beckie Norris   YOB: 1960   Date of Visit: 3/11/2025       Dear Dr. Pierce:    Beckie Norris was in today for her annual gyn exam. Below are my notes for this visit.    If you have questions, please do not hesitate to call me. I look forward to following your patient along with you.         Sincerely,        Haylie Larios MD        CC: No Recipients    Haylie Larios MD  3/11/2025 10:28 AM  Sign when Signing Visit  Name: Beckie Norris      : 1960      MRN: [id number]  Encounter Provider: Haylie Larios MD  Encounter Date: 3/11/2025   Encounter department: [facility] [place]  :  Assessment & Plan  Encounter for gynecological examination (general) (routine) without abnormal findings  Here for well check, no breast or gyn concerns. Requesting routine lab orders to f/u with PCP.    Normal breast and pelvic exams.  Last pap 2022 neg/HPV neg; due by   Mammo order given, last 24 BIRADS 1B  Colonoscopy 2024, benign polyp, due   Dexa @67, no risk factors. Calcium recs reviewed       Other ovarian cyst, right side  No symptoms, normal exam, reviewed imaging from last year. No f/u indicated.    3/13/24 AV uterus 5.9 x 3.6 x 4.3 cm with submucosal 2.8 cm leiomyoma. EMS 2 mm. R ov 2.4 x 1.9 cm with 1.6 cm simple cyst. Left ovary not seen.  No free fluid or collections     23 AV uterus 8.5 cm with 3.3 cm submucosal myoma. EMS 8 mm with cystic foci. R ov 2.7 with 1.6 cm avascular hypoechoic nodule and L ov 1.7 cm, WNL       Vaginal atrophy  Option for estradiol cream given with R&B explained. No contraindication.   Rx sent  Orders:  •  estradiol (ESTRACE VAGINAL) 0.1 mg/g vaginal cream; Insert 1 g into the vagina 2 (two) times a week    Screening for metabolic disorder    Orders:  •  Comprehensive metabolic panel; Future    Screening for lipid disorders    Orders:  •   Lipid panel; Future    Screening for deficiency anemia    Orders:  •  CBC and differential; Future    Screening for thyroid disorder    Orders:  •  TSH, 3rd generation with Free T4 reflex; Future    Breast cancer screening by mammogram    Orders:  •  Mammo screening bilateral w 3d and cad; Future        History of Present Illness  HPI  Beckie Norris is a 64 y.o. female who presents for well check.        Review of Systems  No PMB, breast, bladder, bowel changes. No new persistent pain, bloating, early satiety or pelvic pressure  +vaginal dryness    Past Medical History  Past Medical History:   Diagnosis Date   • Colon polyps      Past Surgical History:   Procedure Laterality Date   • COLONOSCOPY  07/2024    Due 2034   • LIPOMA RESECTION  2012     Family History   Problem Relation Age of Onset   • No Known Problems Mother    • No Known Problems Father    • Lung cancer Sister 48   • No Known Problems Daughter    • No Known Problems Maternal Grandmother    • No Known Problems Maternal Grandfather    • No Known Problems Paternal Grandmother    • No Known Problems Paternal Grandfather    • Reye's syndrome Brother    • No Known Problems Son    • No Known Problems Paternal Aunt    • No Known Problems Paternal Aunt    • Breast cancer Neg Hx    • Uterine cancer Neg Hx    • Ovarian cancer Neg Hx    • Colon cancer Neg Hx       reports that she has never smoked. She has never used smokeless tobacco. She reports current alcohol use. She reports that she does not currently use drugs.  Current Outpatient Medications   Medication Instructions   • [START ON 3/13/2025] estradiol (ESTRACE VAGINAL) 1 g, Vaginal, 2 times weekly   • fexofenadine (ALLEGRA) 180 mg, As needed   • Multiple Vitamins-Minerals (MULTIVITAMIN ADULTS 50+ PO) Take by mouth     Allergies   Allergen Reactions   • Sulfa Antibiotics Other (See Comments)     unknown         Objective  /66 (BP Location: Left arm, Patient Position: Sitting, Cuff Size: Large)   " Ht 5' 2.75\" (1.594 m)   Wt 71.2 kg (157 lb)   BMI 28.03 kg/m²      Physical Exam  General appearance: no distress, pleasant  Neck: thyroid without nodules or thyromegaly, no palpable adenopathy  Lymph nodes: no palpable adenopathy  Breasts: no masses, nodes or skin changes  Abdomen: soft, non tender, no palpable masses  Pelvic exam: normal atrophic external genitalia, urethral meatus normal, vagina atrophic without lesions, cervix atrophic without lesions, uterus small, non tender, no adnexal masses, non tender  Rectal exam: normal sphincter tone, no masses, RV confirms above        "

## 2025-05-05 ENCOUNTER — HOSPITAL ENCOUNTER (OUTPATIENT)
Dept: MAMMOGRAPHY | Facility: CLINIC | Age: 65
Discharge: HOME | End: 2025-05-05
Payer: COMMERCIAL

## 2025-05-05 VITALS — BODY MASS INDEX: 27.82 KG/M2 | HEIGHT: 63 IN | WEIGHT: 157 LBS

## 2025-05-05 DIAGNOSIS — Z12.31 BREAST CANCER SCREENING BY MAMMOGRAM: ICD-10-CM

## 2025-05-05 PROCEDURE — 77067 SCR MAMMO BI INCL CAD: CPT

## 2025-05-05 PROCEDURE — 77063 BREAST TOMOSYNTHESIS BI: CPT

## 2025-05-08 ENCOUNTER — RESULTS FOLLOW-UP (OUTPATIENT)
Dept: OBGYN CLINIC | Facility: CLINIC | Age: 65
End: 2025-05-08